# Patient Record
Sex: FEMALE | Race: OTHER | URBAN - METROPOLITAN AREA
[De-identification: names, ages, dates, MRNs, and addresses within clinical notes are randomized per-mention and may not be internally consistent; named-entity substitution may affect disease eponyms.]

---

## 2022-01-05 NOTE — PROCEDURE NOTE: CLINICAL
PROCEDURE NOTE: Laser for Retinal Tear OD. Diagnosis: Horseshoe Tear. Anesthesia: Topical. Prior to laser, risks/benefits/alternatives to laser discussed including loss of vision, decreased peripheral and night vision, need for more laser and/or surgery and patient wished to proceed. A written consent is on file, and the need for today’s laser was discussed and the patient is understanding and wishes to proceed. Laser Lens: Superquad. Wavelength: Argon Green. Spot size: 200 um. Pulse power: 360 mW. Number of pulses: 15. Patient tolerated procedure well. There were no complications. Post-op instructions given. Patient given office phone number/answering service number and advised to call immediately should there be loss of vision or pain, or should they have any other questions or concerns. Myles Medina

## 2022-04-11 NOTE — PATIENT DISCUSSION
Patient called just wanting to know where we were at with the [facility] paperwork she dropped off and if we needed anything else from her.     Please give patient a call back with update   Retinal tear and detachment warning symptoms reviewed and patient instructed to call immediately if increasing floaters, flashes, or decreasing peripheral vision.

## 2022-09-14 ENCOUNTER — NEW PATIENT (OUTPATIENT)
Dept: URBAN - METROPOLITAN AREA CLINIC 14 | Facility: CLINIC | Age: 52
End: 2022-09-14

## 2022-09-14 DIAGNOSIS — H11.823: ICD-10-CM

## 2022-09-14 DIAGNOSIS — H04.123: ICD-10-CM

## 2022-09-14 DIAGNOSIS — H25.13: ICD-10-CM

## 2022-09-14 DIAGNOSIS — H17.9: ICD-10-CM

## 2022-09-14 DIAGNOSIS — H40.053: ICD-10-CM

## 2022-09-14 DIAGNOSIS — H43.813: ICD-10-CM

## 2022-09-14 PROCEDURE — 99204 OFFICE O/P NEW MOD 45 MIN: CPT

## 2022-09-14 PROCEDURE — 92136 OPHTHALMIC BIOMETRY: CPT

## 2022-09-14 PROCEDURE — 99199ADVT ADVANCED VISION TESTING

## 2022-09-14 PROCEDURE — 92015 DETERMINE REFRACTIVE STATE: CPT

## 2022-09-14 ASSESSMENT — VISUAL ACUITY
OD_BCVA: 20/40
OD_CC: 20/200
OS_CC: 20/400
OS_BCVA: 20/50
OD_PH: 20/50
OS_PH: 20/50

## 2022-09-14 ASSESSMENT — KERATOMETRY
OD_AXISANGLE_DEGREES: 150
OS_K2POWER_DIOPTERS: 44.00
OD_K2POWER_DIOPTERS: 44.00
OS_K1POWER_DIOPTERS: 43.75
OS_AXISANGLE2_DEGREES: 146
OD_AXISANGLE2_DEGREES: 60
OD_K1POWER_DIOPTERS: 43.50
OS_AXISANGLE_DEGREES: 56

## 2022-09-14 ASSESSMENT — TONOMETRY
OS_IOP_MMHG: 21
OD_IOP_MMHG: 23

## 2022-09-21 ENCOUNTER — TECH ONLY (OUTPATIENT)
Dept: URBAN - METROPOLITAN AREA CLINIC 14 | Facility: CLINIC | Age: 52
End: 2022-09-21

## 2022-09-21 DIAGNOSIS — H40.053: ICD-10-CM

## 2022-09-21 PROCEDURE — 92083 EXTENDED VISUAL FIELD XM: CPT

## 2022-09-21 ASSESSMENT — KERATOMETRY
OD_AXISANGLE_DEGREES: 150
OD_AXISANGLE2_DEGREES: 60
OD_K2POWER_DIOPTERS: 44.00
OS_K2POWER_DIOPTERS: 44.00
OD_K1POWER_DIOPTERS: 43.50
OS_AXISANGLE_DEGREES: 56
OS_AXISANGLE2_DEGREES: 146
OS_K1POWER_DIOPTERS: 43.75

## 2022-09-29 ENCOUNTER — POST OP/EVAL OF SECOND EYE (OUTPATIENT)
Dept: URBAN - METROPOLITAN AREA CLINIC 14 | Facility: CLINIC | Age: 52
End: 2022-09-29

## 2022-09-29 DIAGNOSIS — Z96.1: ICD-10-CM

## 2022-09-29 DIAGNOSIS — H25.12: ICD-10-CM

## 2022-09-29 PROCEDURE — 92136 OPHTHALMIC BIOMETRY: CPT

## 2022-09-29 PROCEDURE — 99024 POSTOP FOLLOW-UP VISIT: CPT

## 2022-09-29 ASSESSMENT — VISUAL ACUITY
OD_SC: 20/30
OS_BCVA: 20/50

## 2022-09-29 ASSESSMENT — TONOMETRY
OD_IOP_MMHG: 34
OD_IOP_MMHG: 26
OS_IOP_MMHG: 24

## 2022-10-05 ENCOUNTER — POST-OP (OUTPATIENT)
Dept: URBAN - METROPOLITAN AREA CLINIC 14 | Facility: CLINIC | Age: 52
End: 2022-10-05

## 2022-10-05 DIAGNOSIS — H40.053: ICD-10-CM

## 2022-10-05 DIAGNOSIS — Z96.1: ICD-10-CM

## 2022-10-05 PROCEDURE — 99024 POSTOP FOLLOW-UP VISIT: CPT

## 2022-10-05 ASSESSMENT — VISUAL ACUITY
OD_SC: 20/20
OS_SC: 20/25

## 2022-10-05 ASSESSMENT — TONOMETRY
OD_IOP_MMHG: 22
OD_IOP_MMHG: 20
OS_IOP_MMHG: 20
OS_IOP_MMHG: 20

## 2022-11-07 ENCOUNTER — POST-OP (OUTPATIENT)
Dept: URBAN - METROPOLITAN AREA CLINIC 14 | Facility: CLINIC | Age: 52
End: 2022-11-07

## 2022-11-07 DIAGNOSIS — Z96.1: ICD-10-CM

## 2022-11-07 PROCEDURE — 99024 POSTOP FOLLOW-UP VISIT: CPT

## 2022-11-07 ASSESSMENT — VISUAL ACUITY
OS_SC: 20/200
OS_SC: 20/40 INM
OD_SC: 20/50-1
OU_SC: 20/30 INM
OD_SC: 20/50 INM
OU_SC: 20/40

## 2022-11-07 ASSESSMENT — TONOMETRY
OD_IOP_MMHG: 17
OD_IOP_MMHG: 20
OS_IOP_MMHG: 18
OS_IOP_MMHG: 20

## 2023-01-23 ENCOUNTER — POST-OP (OUTPATIENT)
Dept: URBAN - METROPOLITAN AREA CLINIC 14 | Facility: CLINIC | Age: 53
End: 2023-01-23

## 2023-01-23 DIAGNOSIS — Z96.1: ICD-10-CM

## 2023-01-23 PROCEDURE — 99024 POSTOP FOLLOW-UP VISIT: CPT

## 2023-01-23 ASSESSMENT — TONOMETRY
OD_IOP_MMHG: 21
OS_IOP_MMHG: 23
OD_IOP_MMHG: 22
OS_IOP_MMHG: 21

## 2023-01-23 ASSESSMENT — VISUAL ACUITY
OD_SC: 20/30
OS_PH: 20/30
OS_SC: 20/80

## 2023-05-08 ENCOUNTER — CONSULTATION/EVALUATION (OUTPATIENT)
Dept: URBAN - METROPOLITAN AREA CLINIC 14 | Facility: CLINIC | Age: 53
End: 2023-05-08

## 2023-05-08 DIAGNOSIS — H52.7: ICD-10-CM

## 2023-05-08 DIAGNOSIS — Z96.1: ICD-10-CM

## 2023-05-08 PROCEDURE — 99499LK REFRACTIVE CONSULT/LASIK

## 2023-05-08 ASSESSMENT — KERATOMETRY
OS_AXISANGLE2_DEGREES: 90
OS_K1POWER_DIOPTERS: 44.00
OD_K2POWER_DIOPTERS: 44.25
OS_K2POWER_DIOPTERS: 44.00
OD_K1POWER_DIOPTERS: 43.50
OD_AXISANGLE_DEGREES: 153
OS_AXISANGLE_DEGREES: 0
OD_AXISANGLE2_DEGREES: 63

## 2023-05-08 ASSESSMENT — VISUAL ACUITY
OS_SC: 20/200
OD_BCVA: 20/20
OD_SC: 20/25
OS_BCVA: 20/20

## 2023-05-08 ASSESSMENT — TONOMETRY
OS_IOP_MMHG: 16
OD_IOP_MMHG: 18
OS_IOP_MMHG: 19
OD_IOP_MMHG: 16

## 2023-05-08 ASSESSMENT — PACHYMETRY
OS_CT_UM: 551
OD_CT_UM: 551

## 2023-09-26 ENCOUNTER — CONSULTATION/EVALUATION (OUTPATIENT)
Dept: URBAN - METROPOLITAN AREA CLINIC 14 | Facility: CLINIC | Age: 53
End: 2023-09-26

## 2023-09-26 DIAGNOSIS — H52.7: ICD-10-CM

## 2023-09-26 PROCEDURE — 99499LK REFRACTIVE CONSULT/LASIK

## 2023-09-26 ASSESSMENT — KERATOMETRY
OD_K2POWER_DIOPTERS: 44.25
OS_AXISANGLE2_DEGREES: 90
OD_AXISANGLE_DEGREES: 153
OS_AXISANGLE_DEGREES: 0
OS_K1POWER_DIOPTERS: 44.00
OD_AXISANGLE2_DEGREES: 63
OS_K2POWER_DIOPTERS: 44.00
OD_K1POWER_DIOPTERS: 43.50

## 2023-09-26 ASSESSMENT — VISUAL ACUITY
OS_CC: 20/50
OD_SC: 20/25
OS_SC: 20/80

## 2023-09-27 ASSESSMENT — TONOMETRY
OD_IOP_MMHG: 18
OS_IOP_MMHG: 16